# Patient Record
Sex: FEMALE | Race: WHITE | URBAN - METROPOLITAN AREA
[De-identification: names, ages, dates, MRNs, and addresses within clinical notes are randomized per-mention and may not be internally consistent; named-entity substitution may affect disease eponyms.]

---

## 2021-04-01 ENCOUNTER — OFFICE VISIT (OUTPATIENT)
Dept: URBAN - METROPOLITAN AREA CLINIC 80 | Facility: CLINIC | Age: 31
End: 2021-04-01

## 2021-04-05 ENCOUNTER — WEB ENCOUNTER (OUTPATIENT)
Dept: URBAN - METROPOLITAN AREA CLINIC 23 | Facility: CLINIC | Age: 31
End: 2021-04-05

## 2021-04-05 ENCOUNTER — OFFICE VISIT (OUTPATIENT)
Dept: URBAN - METROPOLITAN AREA CLINIC 23 | Facility: CLINIC | Age: 31
End: 2021-04-05
Payer: SELF-PAY

## 2021-04-05 DIAGNOSIS — Z79.1 NSAID LONG-TERM USE: ICD-10-CM

## 2021-04-05 DIAGNOSIS — K56.1 INTUSSUSCEPTION: ICD-10-CM

## 2021-04-05 PROCEDURE — G9622 NO UNHEAL ETOH USER: HCPCS | Performed by: INTERNAL MEDICINE

## 2021-04-05 PROCEDURE — G8420 CALC BMI NORM PARAMETERS: HCPCS | Performed by: INTERNAL MEDICINE

## 2021-04-05 PROCEDURE — G8427 DOCREV CUR MEDS BY ELIG CLIN: HCPCS | Performed by: INTERNAL MEDICINE

## 2021-04-05 PROCEDURE — 3017F COLORECTAL CA SCREEN DOC REV: CPT | Performed by: INTERNAL MEDICINE

## 2021-04-05 PROCEDURE — 99244 OFF/OP CNSLTJ NEW/EST MOD 40: CPT | Performed by: INTERNAL MEDICINE

## 2021-04-05 PROCEDURE — 1036F TOBACCO NON-USER: CPT | Performed by: INTERNAL MEDICINE

## 2021-04-05 PROCEDURE — G9903 PT SCRN TBCO ID AS NON USER: HCPCS | Performed by: INTERNAL MEDICINE

## 2021-04-05 NOTE — PHYSICAL EXAM SKIN:
no rashes , no jaundice present , good turgor Patient/Caregiver provided printed discharge information.

## 2021-04-05 NOTE — PREVIOUS WORKUP REVIEWED
.ENDOSCOPIESLABS-Labs 2/26/2021: WBC 4.4, hemoglobin 16.1, platelet 265, sodium 135, potassium 3.7, glucose 89, BUN 13, creatinine 0.8, total protein 7.6, albumin 4.5, total bilirubin 1.8, alkaline phosphatase 63, AST 33, ALT 17, lipase 15. IMAGES-CT abdomen pelvis with contrast 2/27/2021: High density material within the dependent portion of the gallbladder may represent gallbladder sludge.  No gallbladder wall thickening.  Normal biliary ducts.  NG tube in the stomach.  Stomach is decompressed.  Unremarkable small bowel.  No significant small bowel thickening, dilatation or inflammatory change.  Oral contrast noted in the colon.  Normal colon.  No lymphadenopathy or ascites.-Abdominal x-ray 2/26/2021: Bowel gas pattern is nonspecific.  No findings of obstruction.-Right upper quadrant ultrasound 2/26/2021: No gallstones. Gallbladder wall thickening. Normal liver.

## 2021-04-05 NOTE — HPI-TODAY'S VISIT:
This patient was referred by Dr. Hans Blancas for evaluation of intussusception. The copy of this note will be sent to the referring provider. 30-year-old  female presents for follow-up after hospital stay in [facility] for intussusception.  She has had chronic nausea vomiting for the past 5-6 months.  Bilious emesis.  Negative pregnancy test.  CT scan done with the primary care provider, which showed small bowel intussusception.  She went to the emergency room same day and admitted under surgery service.  Dr. Dina Escalante. She was treated with NG tube and supportive care.  Exploratory laparoscopy was planned but discharged with clinical improvement.  She did not have follow-up visit with Dr. Escalante yet. She feels better but still she has nausea and vomiting every 3-4 days.  She reports chronic NSAIDs use, Advil or Excedrin every night.  Sometimes ibuprofen 800 mg for headache 1-2 times per week.  She has been taking NSAIDs more than 10 years.  Indication of headache. No previous surgery. Denies constipation.  Bowel movements this morning, soft.  Daily bowel movements.

## 2021-05-29 ENCOUNTER — DASHBOARD ENCOUNTERS (OUTPATIENT)
Age: 31
End: 2021-05-29